# Patient Record
Sex: MALE | Race: WHITE | HISPANIC OR LATINO | URBAN - METROPOLITAN AREA
[De-identification: names, ages, dates, MRNs, and addresses within clinical notes are randomized per-mention and may not be internally consistent; named-entity substitution may affect disease eponyms.]

---

## 2024-07-22 ENCOUNTER — APPOINTMENT (RX ONLY)
Dept: URBAN - METROPOLITAN AREA CLINIC 15 | Facility: CLINIC | Age: 34
Setting detail: DERMATOLOGY
End: 2024-07-22

## 2024-07-22 VITALS — WEIGHT: 160 LBS | HEIGHT: 69 IN

## 2024-07-22 DIAGNOSIS — L30.9 DERMATITIS, UNSPECIFIED: ICD-10-CM | Status: RESOLVING

## 2024-07-22 PROCEDURE — ? DIAGNOSIS COMMENT

## 2024-07-22 PROCEDURE — ? PRESCRIPTION

## 2024-07-22 PROCEDURE — ? PRESCRIPTION MEDICATION MANAGEMENT

## 2024-07-22 PROCEDURE — ? ADDITIONAL NOTES

## 2024-07-22 PROCEDURE — 99203 OFFICE O/P NEW LOW 30 MIN: CPT

## 2024-07-22 PROCEDURE — ? COUNSELING

## 2024-07-22 RX ORDER — TRIAMCINOLONE ACETONIDE 1 MG/G
1 CREAM TOPICAL BID
Qty: 454 | Refills: 1 | Status: ERX | COMMUNITY
Start: 2024-07-22

## 2024-07-22 RX ADMIN — TRIAMCINOLONE ACETONIDE 1: 1 CREAM TOPICAL at 00:00

## 2024-07-22 ASSESSMENT — LOCATION SIMPLE DESCRIPTION DERM
LOCATION SIMPLE: RIGHT LOWER BACK
LOCATION SIMPLE: LEFT UPPER ARM
LOCATION SIMPLE: LEFT FOREARM
LOCATION SIMPLE: LEFT UPPER BACK
LOCATION SIMPLE: LEFT FOREARM
LOCATION SIMPLE: RIGHT LOWER BACK
LOCATION SIMPLE: RIGHT FOREARM
LOCATION SIMPLE: RIGHT UPPER ARM
LOCATION SIMPLE: RIGHT FOREARM
LOCATION SIMPLE: LEFT UPPER BACK
LOCATION SIMPLE: LEFT UPPER ARM
LOCATION SIMPLE: RIGHT UPPER ARM

## 2024-07-22 ASSESSMENT — LOCATION DETAILED DESCRIPTION DERM
LOCATION DETAILED: RIGHT SUPERIOR MEDIAL MIDBACK
LOCATION DETAILED: RIGHT INFERIOR MEDIAL MIDBACK
LOCATION DETAILED: RIGHT VENTRAL DISTAL FOREARM
LOCATION DETAILED: RIGHT ANTERIOR DISTAL UPPER ARM
LOCATION DETAILED: LEFT ANTERIOR PROXIMAL UPPER ARM
LOCATION DETAILED: LEFT MID-UPPER BACK
LOCATION DETAILED: LEFT PROXIMAL DORSAL FOREARM
LOCATION DETAILED: RIGHT PROXIMAL DORSAL FOREARM
LOCATION DETAILED: RIGHT VENTRAL DISTAL FOREARM
LOCATION DETAILED: RIGHT PROXIMAL DORSAL FOREARM
LOCATION DETAILED: LEFT ANTERIOR PROXIMAL UPPER ARM
LOCATION DETAILED: LEFT MID-UPPER BACK
LOCATION DETAILED: LEFT VENTRAL DISTAL FOREARM
LOCATION DETAILED: RIGHT ANTERIOR DISTAL UPPER ARM
LOCATION DETAILED: LEFT VENTRAL DISTAL FOREARM

## 2024-07-22 ASSESSMENT — LOCATION ZONE DERM
LOCATION ZONE: ARM
LOCATION ZONE: TRUNK
LOCATION ZONE: TRUNK
LOCATION ZONE: ARM

## 2024-07-22 ASSESSMENT — BSA RASH: BSA RASH: 1

## 2024-07-22 ASSESSMENT — SEVERITY ASSESSMENT: SEVERITY: ALMOST CLEAR

## 2024-07-22 ASSESSMENT — PAIN INTENSITY VAS: HOW INTENSE IS YOUR PAIN 0 BEING NO PAIN, 10 BEING THE MOST SEVERE PAIN POSSIBLE?: NO PAIN

## 2024-07-22 ASSESSMENT — ITCH NUMERIC RATING SCALE: HOW SEVERE IS YOUR ITCHING?: 0

## 2024-07-22 NOTE — PROCEDURE: DIAGNOSIS COMMENT
Render Risk Assessment In Note?: no
Comment: Patient had a flared up this morning but no lesions present now. Patient showed pictures of rash
Detail Level: Simple

## 2024-07-22 NOTE — PROCEDURE: ADDITIONAL NOTES
Additional Notes: .\\n\\n- Use lukewarm (not HOT ) water. \\n\\n- Gently pad skin dry (don’t rub) \\n- Use mild soap as Dove sensitive skin fragrance free hypoallergenic cleansers, or cleansers that won’t over dry, irritate the skin, or compromise your skin’s moisture barrier. \\n- Avoid using scrubs and washcloths since it can cause irritation on the skin. \\n\\n\\n*** Recommending products that yehuda may use: La Roche Posay hydrating wash and moisturizer, CeraVe hydrating wash and moisturizer , Aveeno Colloidal oatmeal wash and moisturizer, or Ema cream wash and moisturizer.  \\n\\n-Use ALL Free and Clear clothes Detergent preferable. \\n\\nPatient is to records what he ate and apply when he flares up. \\n\\n.
Detail Level: Simple
Render Risk Assessment In Note?: no

## 2024-07-22 NOTE — PROCEDURE: PRESCRIPTION MEDICATION MANAGEMENT
Initiate Treatment: .\\n\\n* Oral antihistamines: over the counter \\n\\nAM  \\n-  Zyrtec 10 mg tablets (Cetirizine) antihistamines take 1 tablet by mouth every morning as needed for itching, for 1-2 weeks    \\n\\nPM  \\n- Benadryl HCI 25 mg (Diphenhydramine) antihistamine take 1 - 2 tablets by mouth at night as needed for itching, for 1-2 weeks, warned drowsiness.\\n\\n\\n *Topical Regimen:\\n\\n- Dove sensitive soap wash full body once daily. Avoid hot water showers ( luke warm water preferable). \\n- Triamcinolone 0.1% apply a small amount to the affected areas on the body twice daily for 2 weeks. Avoid long term continuous use of topical steroid to avoid side effects such as thinning of skin. \\n- Use Cerave itch relief moisturizer twice daily as needed for itching.\\n\\n.
Detail Level: Zone
Render In Strict Bullet Format?: No
d/w Dr Hou admit for observation + covid 19, to monitor pulse ox and 02 @ 35 wks  continuous pulse ox monitoring  oxygen therapy  continuous NST
Plan: .\\n\\nPlan Prednisone oral treatment. \\n\\n* if no improvement will referred to Dr. Kathy Brothers ( Allergist ) for further evaluation. \\n- Address: [address], [place] [address] \\nPlease call to schedule an appointment at: [phone]. \\n\\n.
Discontinue Regimen: .\\n\\nAvoid taking supplements and protein powders. Avoid sea foods.